# Patient Record
Sex: FEMALE | Race: WHITE | NOT HISPANIC OR LATINO
[De-identification: names, ages, dates, MRNs, and addresses within clinical notes are randomized per-mention and may not be internally consistent; named-entity substitution may affect disease eponyms.]

---

## 2017-10-06 ENCOUNTER — HOSPITAL (OUTPATIENT)
Dept: OTHER | Age: 42
End: 2017-10-06
Attending: ORTHOPAEDIC SURGERY

## 2021-11-01 ENCOUNTER — OFFICE VISIT (OUTPATIENT)
Dept: NEUROLOGY | Facility: CLINIC | Age: 46
End: 2021-11-01
Payer: COMMERCIAL

## 2021-11-01 VITALS
DIASTOLIC BLOOD PRESSURE: 82 MMHG | SYSTOLIC BLOOD PRESSURE: 120 MMHG | RESPIRATION RATE: 16 BRPM | HEART RATE: 76 BPM | BODY MASS INDEX: 44 KG/M2 | WEIGHT: 262 LBS

## 2021-11-01 DIAGNOSIS — G43.009 MIGRAINE WITHOUT AURA AND WITHOUT STATUS MIGRAINOSUS, NOT INTRACTABLE: Primary | ICD-10-CM

## 2021-11-01 PROCEDURE — 3079F DIAST BP 80-89 MM HG: CPT | Performed by: OTHER

## 2021-11-01 PROCEDURE — 3074F SYST BP LT 130 MM HG: CPT | Performed by: OTHER

## 2021-11-01 PROCEDURE — 99204 OFFICE O/P NEW MOD 45 MIN: CPT | Performed by: OTHER

## 2021-11-01 RX ORDER — FLUTICASONE PROPIONATE 50 MCG
1 SPRAY, SUSPENSION (ML) NASAL DAILY PRN
COMMUNITY

## 2021-11-01 RX ORDER — LORATADINE 10 MG/1
10 CAPSULE, LIQUID FILLED ORAL DAILY
COMMUNITY

## 2021-11-01 RX ORDER — UBROGEPANT 50 MG/1
TABLET ORAL
Qty: 10 TABLET | Refills: 2 | Status: SHIPPED | OUTPATIENT
Start: 2021-11-01 | End: 2021-11-12

## 2021-11-01 NOTE — PROGRESS NOTES
HPI:    Patient ID: Herbie Abrams is a 40year old female. HPI   Ismael Nicole is a 40year old pleasant female who presents for migraine evaluation. She is a former patient of Dr Daniel Casas and was treated with Naratriptan.  She reports headache were manageable un Medication Sig Dispense Refill   • Loratadine 10 MG Oral Cap Take 10 mg by mouth daily. • fluticasone propionate 50 MCG/ACT Nasal Suspension 1 spray by Nasal route daily as needed.        Allergies:  Seasonal                OTHER (SEE COMMENTS)  PHYSI preventive therapy.     Start Ubrelvy 50 mg prn as needed and try simple measures migraine diet and supplements  If no improvement or slight increase in frequency would consider preventive therapy    Patient indicates understanding and agrees with the plan

## 2021-11-01 NOTE — PROGRESS NOTES
Former patient of Dr. Stone Hy here for migraine evaluation. Pt reports they have gotten more frequent. She takes excedrin or motrin for acute treatment, which is not working. Pt reports 3 migraines per month, one of which will last two days.  Pain is usu

## 2021-11-03 ENCOUNTER — TELEPHONE (OUTPATIENT)
Dept: NEUROLOGY | Facility: CLINIC | Age: 46
End: 2021-11-03

## 2021-11-03 DIAGNOSIS — G43.009 MIGRAINE WITHOUT AURA AND WITHOUT STATUS MIGRAINOSUS, NOT INTRACTABLE: Primary | ICD-10-CM

## 2021-11-03 NOTE — TELEPHONE ENCOUNTER
[email or web address] PA form for Ubrelvy 50mg completed and faxed to [email or web address] with fax confirmation received.

## 2021-11-05 NOTE — TELEPHONE ENCOUNTER
Received denial for Feliz Been as patient has needed to try two triptans. Only triptan noted is Naratriptan. Routed to provider for input on additional abortive. LM to discuss above with patient.

## 2021-11-08 NOTE — TELEPHONE ENCOUNTER
Patient notified of Gabriela fritz and that nurse will call her with Dr Shelley Rodríguez recommendations when we hear back from her.

## 2021-11-09 NOTE — TELEPHONE ENCOUNTER
MD Tonio Wilks [facility] Nurse 8 minutes ago (10:18 AM)     We can try Rizatriptan or Eletriptan if the patient hasnt taken that before. Please let the patient know that Luis Fernando Villarreal was denied.      Thanks    Relayed via [employer]

## 2021-11-12 NOTE — TELEPHONE ENCOUNTER
To date patient has not viewed [employer] message sent on 11/9/21. Left detailed message on confidential voice mail (OK per HIPAA) relaying Dr. Jaya Kenny recommendation to try Rizatriptan or Eletriptan. Requested patient to c/b office with her choice.

## 2021-11-15 NOTE — TELEPHONE ENCOUNTER
Left detailed VM (ok per HIPAA) that rizatriptan was sent to the pt's pharmacy. Advised to call back with any questions.

## 2022-02-02 ENCOUNTER — TELEMEDICINE (OUTPATIENT)
Dept: NEUROLOGY | Facility: CLINIC | Age: 47
End: 2022-02-02
Payer: COMMERCIAL

## 2022-02-02 DIAGNOSIS — G43.009 MIGRAINE WITHOUT AURA AND WITHOUT STATUS MIGRAINOSUS, NOT INTRACTABLE: Primary | ICD-10-CM

## 2022-02-02 PROCEDURE — 99213 OFFICE O/P EST LOW 20 MIN: CPT | Performed by: OTHER

## 2022-02-03 ENCOUNTER — TELEPHONE (OUTPATIENT)
Dept: NEUROLOGY | Facility: CLINIC | Age: 47
End: 2022-02-03

## 2022-02-04 RX ORDER — UBROGEPANT 50 MG/1
TABLET ORAL
Qty: 10 TABLET | Refills: 2 | Status: SHIPPED | OUTPATIENT
Start: 2022-02-04 | End: 2022-03-06

## 2022-02-04 NOTE — TELEPHONE ENCOUNTER
Linh Kramer approved 2/3/3022-2/3/2023. Rx Ubrelvy 50mg sent to [facility] per written order. Message left on patient VM (ok per HIPAA consent) including to not take Ubrelvy with Triptan and not to use more than 2-3 times per week.

## 2022-07-12 ENCOUNTER — OFFICE VISIT (OUTPATIENT)
Dept: NEUROLOGY | Facility: CLINIC | Age: 47
End: 2022-07-12
Payer: COMMERCIAL

## 2022-07-12 VITALS — SYSTOLIC BLOOD PRESSURE: 120 MMHG | DIASTOLIC BLOOD PRESSURE: 76 MMHG | HEART RATE: 81 BPM

## 2022-07-12 DIAGNOSIS — G43.009 MIGRAINE WITHOUT AURA AND WITHOUT STATUS MIGRAINOSUS, NOT INTRACTABLE: Primary | ICD-10-CM

## 2022-07-12 PROCEDURE — 3078F DIAST BP <80 MM HG: CPT | Performed by: OTHER

## 2022-07-12 PROCEDURE — 3074F SYST BP LT 130 MM HG: CPT | Performed by: OTHER

## 2022-07-12 PROCEDURE — 99213 OFFICE O/P EST LOW 20 MIN: CPT | Performed by: OTHER

## 2022-07-12 RX ORDER — TOPIRAMATE 25 MG/1
50 CAPSULE, COATED PELLETS ORAL EVERY EVENING
Qty: 60 CAPSULE | Refills: 2 | Status: SHIPPED | OUTPATIENT
Start: 2022-07-12

## 2022-07-12 RX ORDER — UBROGEPANT 50 MG/1
1 TABLET ORAL AS NEEDED
Qty: 10 TABLET | Refills: 2 | Status: SHIPPED | OUTPATIENT
Start: 2022-07-12

## 2022-07-12 RX ORDER — UBROGEPANT 50 MG/1
1 TABLET ORAL AS NEEDED
COMMUNITY
Start: 2022-06-24 | End: 2022-07-12

## 2022-08-19 DIAGNOSIS — G43.009 MIGRAINE WITHOUT AURA AND WITHOUT STATUS MIGRAINOSUS, NOT INTRACTABLE: Primary | ICD-10-CM

## 2022-08-22 NOTE — TELEPHONE ENCOUNTER
Topiramate 25 MG Oral Capsule Sprinkle  Take 2 capsules (50 mg total) by mouth every evening.   #180, 0 refills     LOV: 7/12/22  NOV: None scheduled  Last refilled: 7/12/22

## 2022-08-24 RX ORDER — TOPIRAMATE 25 MG/1
CAPSULE, COATED PELLETS ORAL
Qty: 180 CAPSULE | Refills: 0 | Status: SHIPPED | OUTPATIENT
Start: 2022-08-24 | End: 2022-11-10

## 2022-11-10 DIAGNOSIS — G43.009 MIGRAINE WITHOUT AURA AND WITHOUT STATUS MIGRAINOSUS, NOT INTRACTABLE: ICD-10-CM

## 2022-11-10 NOTE — TELEPHONE ENCOUNTER
Refill request for topiramate 25 mg, take 2 caps daily, #180, no refills    LOV: 7/12/22  NOV: none  Last refilled on 8/24/22 for 90 day supply

## 2022-11-11 RX ORDER — TOPIRAMATE 25 MG/1
CAPSULE, COATED PELLETS ORAL
Qty: 180 CAPSULE | Refills: 0 | Status: SHIPPED | OUTPATIENT
Start: 2022-11-11

## 2023-01-27 DIAGNOSIS — G43.009 MIGRAINE WITHOUT AURA AND WITHOUT STATUS MIGRAINOSUS, NOT INTRACTABLE: Primary | ICD-10-CM

## 2023-01-27 RX ORDER — UBROGEPANT 50 MG/1
1 TABLET ORAL AS NEEDED
Qty: 10 TABLET | Refills: 2 | Status: SHIPPED | OUTPATIENT
Start: 2023-01-27

## 2023-01-27 NOTE — TELEPHONE ENCOUNTER
Pt rec'd a letter from [facility] stating the approval for UBRELVY 50 MG Oral Tab is expiring soon. Updated authorization can be Faxed to [phone]. Pt can be reached at [phone] for any additional questions. Pt is also requesting a refill for UBRELVY 50 MG Oral Tab. Endorsed to [facility].

## 2023-01-27 NOTE — TELEPHONE ENCOUNTER
PA has been requested via Epic for [employer].     The patient is requesting a refill on: Ubrelvy    Last OV: 7/12/22  Next OV: None scheduled  Last refilled: 7/22/22 #10 with 2 refills

## 2023-04-03 ENCOUNTER — TELEPHONE (OUTPATIENT)
Dept: NEUROLOGY | Facility: CLINIC | Age: 48
End: 2023-04-03

## 2023-04-03 NOTE — TELEPHONE ENCOUNTER
PA completed electronically for Ubrelvy 50mg. Will await determination. Reviewed and electronically signed by:  [facility], [address] [place] [address], [facility], [facility]

## 2023-04-03 NOTE — TELEPHONE ENCOUNTER
Received approval for Ubrelvy 50mg. Granted 4/3/23-4/3/24. Form has been sent for scanning. Reviewed and electronically signed by:  [address] [facility], [address] [place] [address], [facility]

## 2024-07-08 ENCOUNTER — TELEPHONE (OUTPATIENT)
Dept: NEUROLOGY | Facility: CLINIC | Age: 49
End: 2024-07-08

## 2024-07-08 ENCOUNTER — OFFICE VISIT (OUTPATIENT)
Dept: NEUROLOGY | Facility: CLINIC | Age: 49
End: 2024-07-08
Payer: COMMERCIAL

## 2024-07-08 VITALS
BODY MASS INDEX: 36 KG/M2 | DIASTOLIC BLOOD PRESSURE: 76 MMHG | WEIGHT: 214 LBS | HEART RATE: 88 BPM | SYSTOLIC BLOOD PRESSURE: 116 MMHG | RESPIRATION RATE: 16 BRPM

## 2024-07-08 DIAGNOSIS — G43.009 MIGRAINE WITHOUT AURA AND WITHOUT STATUS MIGRAINOSUS, NOT INTRACTABLE: Primary | ICD-10-CM

## 2024-07-08 PROCEDURE — 3074F SYST BP LT 130 MM HG: CPT | Performed by: OTHER

## 2024-07-08 PROCEDURE — 3078F DIAST BP <80 MM HG: CPT | Performed by: OTHER

## 2024-07-08 PROCEDURE — 99214 OFFICE O/P EST MOD 30 MIN: CPT | Performed by: OTHER

## 2024-07-08 RX ORDER — UBROGEPANT 50 MG/1
1 TABLET ORAL AS NEEDED
Qty: 10 TABLET | Refills: 2 | Status: SHIPPED | OUTPATIENT
Start: 2024-07-08

## 2024-07-08 RX ORDER — ONDANSETRON 4 MG/1
TABLET, FILM COATED ORAL
COMMUNITY

## 2024-07-08 RX ORDER — UBROGEPANT 50 MG/1
TABLET ORAL
Qty: 2 TABLET | Refills: 0 | COMMUNITY
Start: 2024-07-08 | End: 2025-07-08

## 2024-07-08 RX ORDER — CHLORAL HYDRATE 500 MG
1 CAPSULE ORAL EVERY MORNING
COMMUNITY

## 2024-07-08 RX ORDER — SEMAGLUTIDE 1.7 MG/.75ML
1.7 INJECTION, SOLUTION SUBCUTANEOUS WEEKLY
COMMUNITY
Start: 2024-06-12 | End: 2024-07-10

## 2024-07-08 RX ORDER — MELATONIN
1 EVERY MORNING
COMMUNITY

## 2024-07-08 NOTE — PROGRESS NOTES
HPI:    Patient ID: Sheeba Mckeon is a 49 year old female.    Neurologic Problem  Associated symptoms include headaches.   Migraine        Patient is a 49 year old female who presents today for follow-up for migraine. She reports migraines/headaches have improved since last seen. She saw me about 2 years ago and that time we recommended Topiramate for prevention.  She reports she did Topamax for a while due to lack of efficacy she discontinued Topamax.  She changed her diet, cut down on sugar and carbs and noted significant improvement in her headaches.  She states she gets headaches every other month and lasting for 2 days. She was using Ubrelvy as needed which insurance has stopped covering. No new complaints        Initial history  Sheeba is a 46 year old pleasant female who presents for migraine evaluation. She is a former patient of Dr Montes De Oca and was treated with Naratriptan. She reports headache were manageable until this year and they have gotten more frequent.  She reports 10 years ago had an episode of migraine with aura and MRI shows a possible area of small ischemia but on repeat MRI brain everything resolved. She denies any migraine with auras.  She reports 3 migraines per month, one of which will last two days. Pain is usually localized in the front, but sometimes also in the occipital region, and pain is described as pressure. associated with light and sound sensitivity, dizziness and blurry vision. She was taking Excedrin migraine which is not working anymore. She denies any known triggers.       HISTORY:  Past Medical History:    Migraines      Past Surgical History:   Procedure Laterality Date    Replacement total knee Right 09/2018      Family History   Problem Relation Age of Onset    Heart Attack Father     COPD Father     Heart Attack Mother       Social History     Socioeconomic History    Marital status:    Tobacco Use    Smoking status: Former    Smokeless tobacco: Never    Tobacco  comments:     quit in May 2013   Vaping Use    Vaping status: Never Used   Substance and Sexual Activity    Alcohol use: Yes     Alcohol/week: 0.0 standard drinks of alcohol     Comment: socially    Drug use: No   Other Topics Concern    Caffeine Concern Yes     Comment: coffee 1 cups    Exercise Yes     Comment: 2-3 x week        Review of Systems   Constitutional: Negative.    HENT: Negative.     Eyes: Negative.  Negative for visual disturbance.   Respiratory: Negative.     Gastrointestinal: Negative.    Endocrine: Negative.    Genitourinary: Negative.    Musculoskeletal: Negative.    Skin: Negative.    Allergic/Immunologic: Negative.    Neurological:  Positive for headaches.   Hematological: Negative.    Psychiatric/Behavioral: Negative.     All other systems reviewed and are negative.           Current Outpatient Medications   Medication Sig Dispense Refill    Cholecalciferol 50 MCG (2000 UT) Oral Cap Take 1 tablet by mouth every morning.      cyanocobalamin 1000 MCG Oral Tab Take 1 tablet (1,000 mcg total) by mouth every morning.      WEGOVY 1.7 MG/0.75ML Subcutaneous Solution Auto-injector Inject 0.75 mL (1.7 mg total) into the skin once a week.      ondansetron (ZOFRAN) 4 mg tablet take 1 tablet by oral route  every 8 hours as needed for nausea      omega-3 fatty acids 1000 MG Oral Cap Take 1 tablet by mouth every morning.      Loratadine 10 MG Oral Cap Take 10 mg by mouth daily.      fluticasone propionate 50 MCG/ACT Nasal Suspension 1 spray by Nasal route daily as needed.      TOPIRAMATE 25 MG Oral Capsule Sprinkle TAKE 2 CAPSULES(50 MG) BY MOUTH EVERY EVENING (Patient not taking: Reported on 7/8/2024) 180 capsule 0     Allergies:  Allergies   Allergen Reactions    Pollen Extract OTHER (SEE COMMENTS)    Seasonal OTHER (SEE COMMENTS)    Banana ITCHING    Charentais Melon (French Melon) ITCHING    Melons ITCHING     PHYSICAL EXAM:   Physical Exam  Blood pressure 116/76, pulse 88, resp. rate 16, weight 214 lb  (97.1 kg).      General Appearance: well nourished, well developed, no apparent distress.     HEENT: Normocephalic and atraumatic.   Neck: Normal range of motion. Neck supple.  Cardiovascular: Normal rate, regular rhythm and normal heart sounds.    Pulmonary/Chest: Effort normal and breath sounds normal.   Abdominal: Soft. Bowel sounds are normal.     Neurological: Patient is awake, alert and oriented to person, place and time with normal memory, fund of knowledge, attention/concentration and language.    Cranial Nerves:   II: Visual acuity: normal  II: Visual fields: normal  III: Pupils: equal, round, reactive to light  III,IV,VI: Extra Ocular Movements: intact  V: Facial sensation: intact  VII: Facial strength: intact  VIII: Hearing: intact  IX: Palate: intact  XI: Shoulder shrug: intact  XII: Tongue movement: normal    Motor: Normal tone. Strength is  5 out of 5 in all extremities bilaterally.  DTR: 1+ in arms, 2+ patellar and 1+ achilles    Sensory: Sensory examination is normal to light touch and pinprick     Coordination: Finger-to-nose test intact    Gait: normal casual gait.       TESTS/IMAGING:       Impression   CONCLUSION:  No acute intracranial abnormality identified. No significant findings in the area of previous abnormality along the left occipital lobe.            ASSESSMENT/PLAN:   (G43.009) Migraine without aura and without status migrainosus, not intractable  (primary encounter diagnosis)    Patient is a 49-year-old female who presented for follow-up for migraines.    States migraine has improved with diet modification  She would like to continue abortive therapy  Continue Ubrelvy 50 mg prn as needed   Maintain a headache diary    Follow up in about 6 months or sooner if necessary    Orders Placed This Encounter    Cholecalciferol 50 MCG (2000 UT) Oral Cap     Sig: Take 1 tablet by mouth every morning.    cyanocobalamin 1000 MCG Oral Tab     Sig: Take 1 tablet (1,000 mcg total) by mouth every  morning.    WEGOVY 1.7 MG/0.75ML Subcutaneous Solution Auto-injector     Sig: Inject 0.75 mL (1.7 mg total) into the skin once a week.    ondansetron (ZOFRAN) 4 mg tablet     Sig: take 1 tablet by oral route  every 8 hours as needed for nausea    omega-3 fatty acids 1000 MG Oral Cap     Sig: Take 1 tablet by mouth every morning.    UBRELVY 50 MG Oral Tab     Sig: Take 50 mg by mouth as needed. MAY TAKE ADDITIONAL TABLET IN 2 HOURS AS NEEDED. DO NOT EXCEED 2 TABLETS PER 24 HOUR PERIOD     Dispense:  10 tablet     Refill:  2      Gwendolyn Landeros MD  [facility] This Visit:  Requested Prescriptions      No prescriptions requested or ordered in this encounter       Imaging & Referrals:  None     ID#[id number]

## 2024-07-08 NOTE — PATIENT INSTRUCTIONS
Refill policies:    Allow 2-3 business days for refills; controlled substances may take longer.  Contact your pharmacy at least 5 days prior to running out of medication and have them send an electronic request or submit request through the “request refill” option in your [employer] account.  Refills are not addressed on weekends; covering physicians do not authorize routine medications on weekends.  No narcotics or controlled substances are refilled after noon on Fridays or by on call physicians.  By law, narcotics must be electronically prescribed.  A 30 day supply with no refills is the maximum allowed.  If your prescription is due for a refill, you may be due for a follow up appointment.  To best provide you care, patients receiving routine medications need to be seen at least once a year.  Patients receiving narcotic/controlled substance medications need to be seen at least once every 3 months.  In the event that your preferred pharmacy does not have the requested medication in stock (e.g. Backordered), it is your responsibility to find another pharmacy that has the requested medication available.  We will gladly send a new prescription to that pharmacy at your request.    Scheduling Tests:    If your physician has ordered radiology tests such as MRI or CT scans, please contact Central Scheduling at [phone] right away to schedule the test.  Once scheduled, the [facility] Centralized Referral Team will work with your insurance carrier to obtain pre-certification or prior authorization.  Depending on your insurance carrier, approval may take 3-10 days.  It is highly recommended patients assure they have received an authorization before having a test performed.  If test is done without insurance authorization, patient may be responsible for the entire amount billed.      Precertification and Prior Authorizations:  If your physician has recommended that you have a procedure or additional testing performed the [facility]  Centralized Referral Team will contact your insurance carrier to obtain pre-certification or prior authorization.    You are strongly encouraged to contact your insurance carrier to verify that your procedure/test has been approved and is a COVERED benefit.  Although the [facility] Centralized Referral Team does its due diligence, the insurance carrier gives the disclaimer that \"Although the procedure is authorized, this does not guarantee payment.\"    Ultimately the patient is responsible for payment.   Thank you for your understanding in this matter.  Paperwork Completion:  If you require FMLA or disability paperwork for your recovery, please make sure to either drop it off or have it faxed to our office at [phone]. Be sure the form has your name and date of birth on it.  The form will be faxed to our Forms Department and they will complete it for you.  There is a 25$ fee for all forms that need to be filled out.  Please be aware there is a 10-14 day turnaround time.  You will need to sign a release of information (DOUG) form if your paperwork does not come with one.  You may call the Forms Department with any questions at [phone].  Their fax number is [phone].

## 2024-07-08 NOTE — TELEPHONE ENCOUNTER
Prior Authorization completed through epic    Completed questions    Prior Authorization History  UBRELVY 50 MG Oral Tab     History    View all authorizations for this medication  Approved   7/8/2024 10:28 AM  Appeal supported: No   Note from payer: Your request was approved based on the initial information provided at the time of the coverage request submission. Please allow additional time for the final decision to be made and added to the patient's account.   Payer: [employer] [facility] Case ID: [id number]    [phone]    [phone]

## (undated) DIAGNOSIS — G43.009 MIGRAINE WITHOUT AURA AND WITHOUT STATUS MIGRAINOSUS, NOT INTRACTABLE: Primary | ICD-10-CM